# Patient Record
Sex: MALE | Race: WHITE | URBAN - METROPOLITAN AREA
[De-identification: names, ages, dates, MRNs, and addresses within clinical notes are randomized per-mention and may not be internally consistent; named-entity substitution may affect disease eponyms.]

---

## 2017-11-13 ENCOUNTER — OFFICE VISIT (OUTPATIENT)
Dept: FAMILY MEDICINE CLINIC | Facility: CLINIC | Age: 27
End: 2017-11-13

## 2017-11-13 VITALS
HEART RATE: 88 BPM | SYSTOLIC BLOOD PRESSURE: 124 MMHG | WEIGHT: 182 LBS | DIASTOLIC BLOOD PRESSURE: 70 MMHG | HEIGHT: 69.5 IN | OXYGEN SATURATION: 99 % | BODY MASS INDEX: 26.35 KG/M2

## 2017-11-13 DIAGNOSIS — E66.3 OVERWEIGHT: ICD-10-CM

## 2017-11-13 DIAGNOSIS — Z13.31 DEPRESSION SCREEN: ICD-10-CM

## 2017-11-13 DIAGNOSIS — Z28.21 IMMUNIZATION NOT CARRIED OUT BECAUSE OF PATIENT REFUSAL: ICD-10-CM

## 2017-11-13 DIAGNOSIS — Z00.01 ENCOUNTER FOR ROUTINE ADULT HEALTH EXAMINATION WITH ABNORMAL FINDINGS: Primary | ICD-10-CM

## 2017-11-13 DIAGNOSIS — F41.9 ANXIETY: ICD-10-CM

## 2017-11-13 PROCEDURE — 99385 PREV VISIT NEW AGE 18-39: CPT

## 2017-11-14 ENCOUNTER — NURSE ONLY (OUTPATIENT)
Dept: FAMILY MEDICINE CLINIC | Facility: CLINIC | Age: 27
End: 2017-11-14

## 2017-11-14 DIAGNOSIS — Z00.01 ENCOUNTER FOR ROUTINE ADULT HEALTH EXAMINATION WITH ABNORMAL FINDINGS: ICD-10-CM

## 2017-11-14 PROBLEM — E66.3 OVERWEIGHT: Status: ACTIVE | Noted: 2017-11-14

## 2017-11-14 PROBLEM — Z28.21 IMMUNIZATION NOT CARRIED OUT BECAUSE OF PATIENT REFUSAL: Status: ACTIVE | Noted: 2017-11-14

## 2017-11-14 PROBLEM — Z13.31 DEPRESSION SCREEN: Status: ACTIVE | Noted: 2017-11-14

## 2017-11-14 PROBLEM — F41.9 ANXIETY: Status: ACTIVE | Noted: 2017-11-14

## 2017-11-14 PROCEDURE — 80061 LIPID PANEL: CPT

## 2017-11-14 PROCEDURE — 36415 COLL VENOUS BLD VENIPUNCTURE: CPT

## 2017-11-14 PROCEDURE — 80053 COMPREHEN METABOLIC PANEL: CPT

## 2017-11-14 PROCEDURE — 85027 COMPLETE CBC AUTOMATED: CPT

## 2017-11-14 PROCEDURE — 81003 URINALYSIS AUTO W/O SCOPE: CPT

## 2017-11-14 NOTE — PATIENT INSTRUCTIONS
Pautas de prevención, hombres de entre 18 y 44 años de edad  Las pruebas de detección y las vacunas son importantes para el manejo de vega jona.  La consejería sobre vega jona también es esencial. A continuación, verá pautas en relación con esos temas para [place] [employer] hombres de kenny lázaro de edad Cada hilario a kwabena años si no existen factores de riesgo que predispongan a tener enfermedades de los ojos   Bianca Gonzalez   Varicela Todos los hombres de kenny lázaro de edad que no tienen registro d PCV13: raleigh dosis [employer] 19 y 72 años de edad (protege contra 13 tipos de bacteria neumocócica)    PPSV23: Rena Medina a dos dosis [employer] 58 años de [place], o r[place] dosis a partir de los 64 años (protege contra 23 tipos de bacteria neumocócica)   Refuerzo de téta

## 2017-11-14 NOTE — PROGRESS NOTES
CC: Annual Physical Exam     HPI:   Wil Pan is a 28year old male who presents for a complete physical exam. Pt complains of intermittent episodes of anxiety during which he experiences palpitations and sweats.  He has tried breathing exercises with even sputum. GASTROINTESTINAL:  Denies abdominal pain, nausea, vomiting, constipation, diarrhea, or blood in stool. MUSCULOSKELETAL:  Denies weakness, muscle aches, back pain, joint pain, swelling or stiffness.   NEUROLOGICAL:  Denies headache, seizures, dizzi FROM.  EXTREMITIES:  No edema, no cyanosis, no clubbing, FROM, 2+ dorsalis pedis pulses bilaterally. NEURO:  No deficit, normal gait, strength and tone, sensory intact, normal reflexes.     ASSESSMENT AND PLAN:   Idania Navarrete is a 28year old male who presen 1 year for annual physical exam.

## 2017-11-14 NOTE — PROGRESS NOTES
Patient presented to clinic for routine blood work. Orders verified in patient chart. Name and  verified. Patient is fasting. Blood drawn from the right antecubital, tolerated well without complications.

## 2017-11-16 ENCOUNTER — TELEPHONE (OUTPATIENT)
Dept: FAMILY MEDICINE CLINIC | Facility: CLINIC | Age: 27
End: 2017-11-16

## 2017-11-16 DIAGNOSIS — E78.00 HYPERCHOLESTEREMIA: Primary | ICD-10-CM

## 2017-11-16 RX ORDER — SIMVASTATIN 20 MG
20 TABLET ORAL NIGHTLY
Qty: 90 TABLET | Refills: 0 | Status: SHIPPED | OUTPATIENT
Start: 2017-11-16

## 2017-11-16 NOTE — TELEPHONE ENCOUNTER
Results were reviewed by Dr Re Harrington, patient's cholesterol was high and he is prescribing him medications for it. Results were reviewed and discussed with patient.